# Patient Record
Sex: MALE | Race: WHITE | HISPANIC OR LATINO | URBAN - METROPOLITAN AREA
[De-identification: names, ages, dates, MRNs, and addresses within clinical notes are randomized per-mention and may not be internally consistent; named-entity substitution may affect disease eponyms.]

---

## 2019-08-13 ENCOUNTER — OFFICE (OUTPATIENT)
Dept: URBAN - METROPOLITAN AREA CLINIC 101 | Facility: CLINIC | Age: 69
End: 2019-08-13
Payer: COMMERCIAL

## 2019-08-13 VITALS
SYSTOLIC BLOOD PRESSURE: 127 MMHG | WEIGHT: 166 LBS | DIASTOLIC BLOOD PRESSURE: 72 MMHG | HEIGHT: 68 IN | HEART RATE: 68 BPM | TEMPERATURE: 98.1 F | DIASTOLIC BLOOD PRESSURE: 102 MMHG | SYSTOLIC BLOOD PRESSURE: 128 MMHG

## 2019-08-13 DIAGNOSIS — A09 INFECTIOUS GASTROENTERITIS AND COLITIS, UNSPECIFIED: ICD-10-CM

## 2019-08-13 PROCEDURE — 99214 OFFICE O/P EST MOD 30 MIN: CPT

## 2019-08-13 NOTE — SERVICEHPINOTES
TONI BATRES   is a   69   male who is here for f/u hospital visit on 07/21/19. He recalls acute onset of non-bloody diarrhea that started the day prior to hospital visit along with upper abdominal pains and nausea. He states no recent abx use, change in medications/diet, well water at home, travel, or sick contacts around the time of this visit. His CT abdominal/pelvic scan was normal (reviewed records). Reviewed 07/2019 labs showing no leukocytosis, no anemia, CMP wnl's, and normal lipase: No stool studies done.  He was d/c with Pepcid 20 mg BID that he has been taking with no improvement noticed so far. No h/o h. pylori. Rare NSAID use. No ETOH/tobacco abuse. No fm h/o digestive disorders. He express concern about possible prior "foodborne illness" that he feels was the start of all his recurrent symptoms that have been manifesting in "bouts" that are short lasting with similar symptoms. He recalls x 3 months ago having initial first "bout of diarrhea" that he suspected was due to suspected foodborne illness due to acute diarrhea, nausea, vomiting, abdominal pain that began after eating out at restaurant (steak, salad, blue moon beer) and these symptoms lasted 2 to 3 days the resolved on their own. He was seen by PCP for these concerns that per patient agreed with his suspicion of likely "GI bug." He mentions a few weeks ago starting a probiotic with strength of 10 billion, 1 tab BID that caused recurrent diarrhea so it was stopped. He avoids dairy in general: No h/o lactose intolerance. He is concerned about a possible GI bug due to total of 3 "bouts of diarrhea" which manifest with BMs 5 to 6x/day, BSS type 7 that last up to 3 days then resolves on its own. No other risk factors such as well water, travel outside the country, sick contacts. Denies fevers,BRvomiting, regurgitation, dysphagia, dyspepsia, decreased appetite, early satiety, blood in stool, rectal bleeding, weight loss. BR

## 2019-09-04 LAB

## 2020-10-27 ENCOUNTER — OFFICE (OUTPATIENT)
Dept: URBAN - METROPOLITAN AREA CLINIC 102 | Facility: CLINIC | Age: 70
End: 2020-10-27
Payer: COMMERCIAL

## 2020-10-27 VITALS
DIASTOLIC BLOOD PRESSURE: 71 MMHG | WEIGHT: 163 LBS | TEMPERATURE: 97.2 F | HEIGHT: 68 IN | HEART RATE: 81 BPM | SYSTOLIC BLOOD PRESSURE: 128 MMHG

## 2020-10-27 DIAGNOSIS — K58.0 IRRITABLE BOWEL SYNDROME WITH DIARRHEA: ICD-10-CM

## 2020-10-27 DIAGNOSIS — R19.7 DIARRHEA, UNSPECIFIED: ICD-10-CM

## 2020-10-27 LAB
CELIAC DISEASE COMPREHENSIVE: DEAMIDATED GLIADIN ABS, IGA: 3 UNITS (ref 0–19)
CELIAC DISEASE COMPREHENSIVE: DEAMIDATED GLIADIN ABS, IGG: 2 UNITS (ref 0–19)
CELIAC DISEASE COMPREHENSIVE: ENDOMYSIAL ANTIBODY IGA: NEGATIVE
CELIAC DISEASE COMPREHENSIVE: IMMUNOGLOBULIN A, QN, SERUM: 322 MG/DL (ref 61–437)
CELIAC DISEASE COMPREHENSIVE: T-TRANSGLUTAMINASE (TTG) IGA: 5 U/ML — HIGH (ref 0–3)
CELIAC DISEASE COMPREHENSIVE: T-TRANSGLUTAMINASE (TTG) IGG: 10 U/ML — HIGH (ref 0–5)
HEP A AB, IGM: NEGATIVE
HEP A AB, TOTAL: POSITIVE
LIPASE: 29 U/L (ref 13–78)

## 2020-10-27 PROCEDURE — 99214 OFFICE O/P EST MOD 30 MIN: CPT | Performed by: PHYSICIAN ASSISTANT

## 2020-10-27 NOTE — SERVICEHPINOTES
TONI BATRES   is a   69 yo male white  male who complains of acute diarrhea that lasted x 1 month Resolved at this time. He recalls in mid August onset of non-bloody diarrhea that was intermittent in nature given normal stool BSS type 4 with alternating diarrhea (BSS type 6 to 7) for several days on and off until mid September. He states this diarrhea was the only symptom: No fevers, n/v, heartburn, abdominal pain, bloating, or other manifestations. He was seen by PCP for this diarrhea that checked for stool tests including ova/parasites, salmonella, shigella, e coli toxin, campylobacter that were all negative. He was the only one with the diarrhea in his household. He does mention a late birthday celebration in August where he ate a wide variety of sushi that he does not typically eat. Over the past 3 weeks he has resumed his normal diet and no recurrent diarrhea. NSAID use: ASA 81 mg qd that he was used for many years. No ETOH or tobacco abuse. Denies fevers, n/v, heartburn, regurgitation, dysphagia, dyspepsia, decreased appetite, early satiety, blood in stool, rectal bleeding, recurrent diarrhea, weight loss. No other complaints. BR

## 2020-10-28 LAB — PANCREATIC ELASTASE, FECAL: >500 UG ELAST./G

## 2020-11-11 PROBLEM — K90.0 CELIAC DISEASE: Status: ACTIVE | Noted: 2020-11-11

## 2020-11-30 ENCOUNTER — ON CAMPUS - OUTPATIENT (OUTPATIENT)
Dept: URBAN - METROPOLITAN AREA HOSPITAL 37 | Facility: HOSPITAL | Age: 70
End: 2020-11-30
Payer: COMMERCIAL

## 2020-11-30 DIAGNOSIS — K29.60 OTHER GASTRITIS WITHOUT BLEEDING: ICD-10-CM

## 2020-11-30 DIAGNOSIS — K58.0 IRRITABLE BOWEL SYNDROME WITH DIARRHEA: ICD-10-CM

## 2020-11-30 DIAGNOSIS — K29.80 DUODENITIS WITHOUT BLEEDING: ICD-10-CM

## 2020-11-30 PROCEDURE — 43239 EGD BIOPSY SINGLE/MULTIPLE: CPT | Performed by: INTERNAL MEDICINE

## 2022-01-11 ENCOUNTER — OFFICE (OUTPATIENT)
Dept: URBAN - METROPOLITAN AREA CLINIC 102 | Facility: CLINIC | Age: 72
End: 2022-01-11
Payer: COMMERCIAL

## 2022-01-11 VITALS
TEMPERATURE: 98.2 F | HEART RATE: 60 BPM | SYSTOLIC BLOOD PRESSURE: 129 MMHG | WEIGHT: 164 LBS | DIASTOLIC BLOOD PRESSURE: 73 MMHG | HEIGHT: 68 IN

## 2022-01-11 DIAGNOSIS — R19.7 DIARRHEA, UNSPECIFIED: ICD-10-CM

## 2022-01-11 DIAGNOSIS — K90.41 NON-CELIAC GLUTEN SENSITIVITY: ICD-10-CM

## 2022-01-11 PROBLEM — K90.0: Status: ACTIVE | Noted: 2020-11-11

## 2022-01-11 PROCEDURE — 99214 OFFICE O/P EST MOD 30 MIN: CPT | Performed by: PHYSICIAN ASSISTANT

## 2022-01-11 NOTE — SERVICEHPINOTES
TONI BATRES   is a   72 yo white  male who complains of chronic, episodic events of diarrhea. Prior evaluation for this concern has included serology + celiac panel that led to EGD bx focal blunting of villi but neg celiac disease. He recalls early Sept of 2021 recurrent diarrhea then resolution until October 2021 when traveling to [place] to visit family eating out often with return of diarrhea: No illness/sickness. In Nov. of 2021, he recalls no diarrhea. He has cut out  "junk food" and eliminated fish oil supplements with change noted to diarrhea. When he has diarrhea "bouts", it lasts 3-5 days at most with BSS type 6 inconsistency: No fat/greasy stools.  He recalls eating stir porter at home on several occasions and consequently having severe diarrhea.  He states this diarrhea was the only symptom: No fevers, n/v, heartburn, abdominal pain, bloating, or other manifestations. In the past 4 weeks, no recurrent diarrhea. Rare NSAID use. No ETOH or tobacco abuse. Denies fevers, n/v, heartburn, regurgitation, dysphagia, dyspepsia, decreased appetite, early satiety, blood in stool, rectal bleeding, recurrent diarrhea, weight loss. No other complaints.                                                                                                                                        Last colonoscopy in 09/2016 noting diverticulosis, no colonic polyps, no CRC so advised RC in 7-10 yrs.                                      Reviewed records from 03/2021 CT noting distal SBO that per patient [place] [facility] [place] who mentioned possible lysis of adhesions if recurrence of severe episode of SBO.

## 2024-02-12 ENCOUNTER — OFFICE (OUTPATIENT)
Dept: URBAN - METROPOLITAN AREA CLINIC 34 | Facility: CLINIC | Age: 74
End: 2024-02-12
Payer: COMMERCIAL

## 2024-02-12 ENCOUNTER — OFFICE (OUTPATIENT)
Dept: URBAN - METROPOLITAN AREA CLINIC 34 | Facility: CLINIC | Age: 74
End: 2024-02-12

## 2024-02-12 VITALS
SYSTOLIC BLOOD PRESSURE: 136 MMHG | TEMPERATURE: 97.9 F | HEART RATE: 59 BPM | HEIGHT: 68 IN | DIASTOLIC BLOOD PRESSURE: 71 MMHG | WEIGHT: 166 LBS

## 2024-02-12 DIAGNOSIS — K21.9 GASTRO-ESOPHAGEAL REFLUX DISEASE WITHOUT ESOPHAGITIS: ICD-10-CM

## 2024-02-12 DIAGNOSIS — K90.0 CELIAC DISEASE: ICD-10-CM

## 2024-02-12 DIAGNOSIS — Z12.11 ENCOUNTER FOR SCREENING FOR MALIGNANT NEOPLASM OF COLON: ICD-10-CM

## 2024-02-12 PROCEDURE — 99214 OFFICE O/P EST MOD 30 MIN: CPT | Performed by: INTERNAL MEDICINE

## 2024-02-12 PROCEDURE — 00031: CPT | Performed by: INTERNAL MEDICINE

## 2024-03-04 LAB
CELIAC DISEASE COMPREHENSIVE: DEAMIDATED GLIADIN ABS, IGA: 1 UNITS (ref 0–19)
CELIAC DISEASE COMPREHENSIVE: DEAMIDATED GLIADIN ABS, IGG: 1 UNITS (ref 0–19)
CELIAC DISEASE COMPREHENSIVE: ENDOMYSIAL ANTIBODY IGA: NEGATIVE
CELIAC DISEASE COMPREHENSIVE: IMMUNOGLOBULIN A, QN, SERUM: 263 MG/DL (ref 61–437)
CELIAC DISEASE COMPREHENSIVE: T-TRANSGLUTAMINASE (TTG) IGA: 4 U/ML — HIGH (ref 0–3)
CELIAC DISEASE COMPREHENSIVE: T-TRANSGLUTAMINASE (TTG) IGG: 6 U/ML — HIGH (ref 0–5)

## 2024-04-29 ENCOUNTER — AMBULATORY SURGICAL CENTER (OUTPATIENT)
Dept: URBAN - METROPOLITAN AREA SURGERY 23 | Facility: SURGERY | Age: 74
End: 2024-04-29
Payer: COMMERCIAL

## 2024-04-29 DIAGNOSIS — K57.30 DIVERTICULOSIS OF LARGE INTESTINE WITHOUT PERFORATION OR ABS: ICD-10-CM

## 2024-04-29 DIAGNOSIS — K31.89 OTHER DISEASES OF STOMACH AND DUODENUM: ICD-10-CM

## 2024-04-29 DIAGNOSIS — K21.00 GASTRO-ESOPHAGEAL REFLUX DISEASE WITH ESOPHAGITIS, WITHOUT B: ICD-10-CM

## 2024-04-29 DIAGNOSIS — D12.2 BENIGN NEOPLASM OF ASCENDING COLON: ICD-10-CM

## 2024-04-29 DIAGNOSIS — Z12.11 ENCOUNTER FOR SCREENING FOR MALIGNANT NEOPLASM OF COLON: ICD-10-CM

## 2024-04-29 DIAGNOSIS — K29.80 DUODENITIS WITHOUT BLEEDING: ICD-10-CM

## 2024-04-29 DIAGNOSIS — K64.0 FIRST DEGREE HEMORRHOIDS: ICD-10-CM

## 2024-04-29 DIAGNOSIS — D12.3 BENIGN NEOPLASM OF TRANSVERSE COLON: ICD-10-CM

## 2024-04-29 PROCEDURE — 45380 COLONOSCOPY AND BIOPSY: CPT | Mod: PT | Performed by: INTERNAL MEDICINE

## 2024-04-29 PROCEDURE — 43239 EGD BIOPSY SINGLE/MULTIPLE: CPT | Performed by: INTERNAL MEDICINE
